# Patient Record
Sex: MALE | Race: WHITE | Employment: FULL TIME | URBAN - METROPOLITAN AREA
[De-identification: names, ages, dates, MRNs, and addresses within clinical notes are randomized per-mention and may not be internally consistent; named-entity substitution may affect disease eponyms.]

---

## 2019-10-07 RX ORDER — LANOLIN ALCOHOL/MO/W.PET/CERES
1000 CREAM (GRAM) TOPICAL DAILY
COMMUNITY

## 2019-10-07 RX ORDER — LANOLIN ALCOHOL/MO/W.PET/CERES
400 CREAM (GRAM) TOPICAL 2 TIMES DAILY
COMMUNITY

## 2019-10-07 RX ORDER — GLUCOSAMINE SULFATE 1500 MG
1000 POWDER IN PACKET (EA) ORAL DAILY
COMMUNITY

## 2019-10-07 RX ORDER — BUTALB/ACETAMINOPHEN/CAFFEINE 50-325-40
400 TABLET ORAL 2 TIMES DAILY
COMMUNITY

## 2019-10-07 RX ORDER — SULFASALAZINE 500 MG/1
1000 TABLET ORAL 2 TIMES DAILY
COMMUNITY

## 2019-10-07 RX ORDER — IRBESARTAN AND HYDROCHLOROTHIAZIDE 300; 12.5 MG/1; MG/1
1 TABLET, FILM COATED ORAL DAILY
COMMUNITY

## 2019-10-07 RX ORDER — AMLODIPINE BESYLATE 5 MG/1
5 TABLET ORAL DAILY
COMMUNITY

## 2019-10-07 RX ORDER — PREDNISONE 10 MG/1
10 TABLET ORAL DAILY
COMMUNITY

## 2019-10-08 ENCOUNTER — ANESTHESIA EVENT (OUTPATIENT)
Dept: ENDOSCOPY | Age: 64
End: 2019-10-08
Payer: MEDICARE

## 2019-10-08 ENCOUNTER — ANESTHESIA (OUTPATIENT)
Dept: ENDOSCOPY | Age: 64
End: 2019-10-08
Payer: MEDICARE

## 2019-10-08 ENCOUNTER — HOSPITAL ENCOUNTER (OUTPATIENT)
Age: 64
Setting detail: OUTPATIENT SURGERY
Discharge: HOME | End: 2019-10-08
Attending: INTERNAL MEDICINE | Admitting: INTERNAL MEDICINE
Payer: MEDICARE

## 2019-10-08 VITALS
OXYGEN SATURATION: 98 % | BODY MASS INDEX: 35.79 KG/M2 | RESPIRATION RATE: 16 BRPM | DIASTOLIC BLOOD PRESSURE: 80 MMHG | WEIGHT: 250 LBS | HEIGHT: 70 IN | SYSTOLIC BLOOD PRESSURE: 145 MMHG | HEART RATE: 65 BPM

## 2019-10-08 PROCEDURE — 74011250636 HC RX REV CODE- 250/636: Performed by: NURSE ANESTHETIST, CERTIFIED REGISTERED

## 2019-10-08 PROCEDURE — 76040000007: Performed by: INTERNAL MEDICINE

## 2019-10-08 PROCEDURE — 88305 TISSUE EXAM BY PATHOLOGIST: CPT

## 2019-10-08 PROCEDURE — 77030037041 HC FCPS HOT BIOP ENDOSC RAD JAW DISP BSC -B: Performed by: INTERNAL MEDICINE

## 2019-10-08 PROCEDURE — 76060000032 HC ANESTHESIA 0.5 TO 1 HR: Performed by: INTERNAL MEDICINE

## 2019-10-08 PROCEDURE — 74011000250 HC RX REV CODE- 250: Performed by: NURSE ANESTHETIST, CERTIFIED REGISTERED

## 2019-10-08 RX ORDER — ATROPINE SULFATE 0.1 MG/ML
0.5 INJECTION INTRAVENOUS
Status: DISCONTINUED | OUTPATIENT
Start: 2019-10-08 | End: 2019-10-08 | Stop reason: HOSPADM

## 2019-10-08 RX ORDER — PROPOFOL 10 MG/ML
INJECTION, EMULSION INTRAVENOUS AS NEEDED
Status: DISCONTINUED | OUTPATIENT
Start: 2019-10-08 | End: 2019-10-08 | Stop reason: HOSPADM

## 2019-10-08 RX ORDER — MIDAZOLAM HYDROCHLORIDE 1 MG/ML
.25-5 INJECTION, SOLUTION INTRAMUSCULAR; INTRAVENOUS
Status: DISCONTINUED | OUTPATIENT
Start: 2019-10-08 | End: 2019-10-08 | Stop reason: HOSPADM

## 2019-10-08 RX ORDER — LIDOCAINE HYDROCHLORIDE 20 MG/ML
INJECTION, SOLUTION INFILTRATION; PERINEURAL AS NEEDED
Status: DISCONTINUED | OUTPATIENT
Start: 2019-10-08 | End: 2019-10-08 | Stop reason: HOSPADM

## 2019-10-08 RX ORDER — EPINEPHRINE 0.1 MG/ML
1 INJECTION INTRACARDIAC; INTRAVENOUS
Status: DISCONTINUED | OUTPATIENT
Start: 2019-10-08 | End: 2019-10-08 | Stop reason: HOSPADM

## 2019-10-08 RX ORDER — SODIUM CHLORIDE 9 MG/ML
150 INJECTION, SOLUTION INTRAVENOUS CONTINUOUS
Status: DISCONTINUED | OUTPATIENT
Start: 2019-10-08 | End: 2019-10-08 | Stop reason: HOSPADM

## 2019-10-08 RX ORDER — SODIUM CHLORIDE 9 MG/ML
INJECTION, SOLUTION INTRAVENOUS
Status: DISCONTINUED | OUTPATIENT
Start: 2019-10-08 | End: 2019-10-08 | Stop reason: HOSPADM

## 2019-10-08 RX ORDER — DEXTROMETHORPHAN/PSEUDOEPHED 2.5-7.5/.8
1.2 DROPS ORAL
Status: DISCONTINUED | OUTPATIENT
Start: 2019-10-08 | End: 2019-10-08 | Stop reason: HOSPADM

## 2019-10-08 RX ORDER — SODIUM CHLORIDE 0.9 % (FLUSH) 0.9 %
5-40 SYRINGE (ML) INJECTION AS NEEDED
Status: DISCONTINUED | OUTPATIENT
Start: 2019-10-08 | End: 2019-10-08 | Stop reason: HOSPADM

## 2019-10-08 RX ORDER — FENTANYL CITRATE 50 UG/ML
200 INJECTION, SOLUTION INTRAMUSCULAR; INTRAVENOUS
Status: DISCONTINUED | OUTPATIENT
Start: 2019-10-08 | End: 2019-10-08 | Stop reason: HOSPADM

## 2019-10-08 RX ORDER — SODIUM CHLORIDE 0.9 % (FLUSH) 0.9 %
5-40 SYRINGE (ML) INJECTION EVERY 8 HOURS
Status: DISCONTINUED | OUTPATIENT
Start: 2019-10-08 | End: 2019-10-08 | Stop reason: HOSPADM

## 2019-10-08 RX ADMIN — PROPOFOL 40 MG: 10 INJECTION, EMULSION INTRAVENOUS at 08:44

## 2019-10-08 RX ADMIN — PROPOFOL 50 MG: 10 INJECTION, EMULSION INTRAVENOUS at 08:34

## 2019-10-08 RX ADMIN — PROPOFOL 30 MG: 10 INJECTION, EMULSION INTRAVENOUS at 08:55

## 2019-10-08 RX ADMIN — PROPOFOL 30 MG: 10 INJECTION, EMULSION INTRAVENOUS at 08:41

## 2019-10-08 RX ADMIN — PROPOFOL 30 MG: 10 INJECTION, EMULSION INTRAVENOUS at 08:47

## 2019-10-08 RX ADMIN — PROPOFOL 50 MG: 10 INJECTION, EMULSION INTRAVENOUS at 08:38

## 2019-10-08 RX ADMIN — PROPOFOL 50 MG: 10 INJECTION, EMULSION INTRAVENOUS at 08:36

## 2019-10-08 RX ADMIN — PROPOFOL 30 MG: 10 INJECTION, EMULSION INTRAVENOUS at 08:45

## 2019-10-08 RX ADMIN — SODIUM CHLORIDE: 900 INJECTION, SOLUTION INTRAVENOUS at 08:25

## 2019-10-08 RX ADMIN — PROPOFOL 50 MG: 10 INJECTION, EMULSION INTRAVENOUS at 08:37

## 2019-10-08 RX ADMIN — PROPOFOL 30 MG: 10 INJECTION, EMULSION INTRAVENOUS at 08:49

## 2019-10-08 RX ADMIN — LIDOCAINE HYDROCHLORIDE 60 MG: 20 INJECTION, SOLUTION INFILTRATION; PERINEURAL at 08:33

## 2019-10-08 NOTE — H&P
[address], [address] [place] [address]          Pre-procedure History and Physical       NAME:  Filomena Eduardo   :   1955   MRN:   [id number]     CHIEF COMPLAINT/HPI: See procedure note    PMH:  Past Medical History:   Diagnosis Date    Hypertension     Ill-defined condition     ulcerative colitis       PSH:  Past Surgical History:   Procedure Laterality Date    ABDOMEN SURGERY PROC UNLISTED      spleen removed d/t being overactive    HX TONSILLECTOMY         Allergies:  No Known Allergies    Home Medications:  Prior to Admission Medications   Prescriptions Last Dose Informant Patient Reported? Taking? MAGNESIUM PO 10/7/2019 at Unknown time  Yes Yes   Sig: Take 500 mg by mouth daily. MULTIVITAMIN PO 10/7/2019 at Unknown time  Yes Yes   Sig: Take  by mouth. Takes one po once daily. OTHER 10/7/2019 at Unknown time  Yes Yes   Sig: Celery seed 1500 mg po twice daily. amLODIPine (NORVASC) 5 mg tablet 10/7/2019 at Unknown time  Yes Yes   Sig: Take 5 mg by mouth daily. cholecalciferol (VITAMIN D3) 1,000 unit cap 10/7/2019 at Unknown time  Yes Yes   Sig: Take 1,000 Units by mouth daily. cinnamon bark (CINNAMON PO) 10/5/2019  Yes Yes   Sig: Take 2,000 mg by mouth two (2) times a day. cyanocobalamin (VITAMIN B-12) 1,000 mcg tablet 10/7/2019 at Unknown time  Yes Yes   Sig: Take 1,000 mcg by mouth daily. folic acid 146 mcg tablet 10/7/2019 at Unknown time  Yes Yes   Sig: Take 400 mcg by mouth two (2) times a day.   garlic 934 mg tab  at Unknown time  Yes Yes   Sig: Take 400 mg by mouth two (2) times a day. irbesartan-hydroCHLOROthiazide (AVALIDE) 300-12.5 mg per tablet 10/8/2019 at Unknown time  Yes Yes   Sig: Take 1 Tab by mouth daily. predniSONE (DELTASONE) 10 mg tablet 10/7/2019 at Unknown time  Yes Yes   Sig: Take 10 mg by mouth daily.  Will be finished 10/10/2019.   sulfaSALAzine (AZULFIDINE) 500 mg tablet 10/7/2019 at Unknown time  Yes Yes Sig: Take 1,000 mg by mouth two (2) times a day. turmeric (CURCUMIN) 10/7/2019 at Unknown time  Yes Yes   Sig: Take 1 Tab by mouth two (2) times a day. Facility-Administered Medications: None       Hospital Medications:  No current facility-administered medications for this encounter. Facility-Administered Medications Ordered in Other Encounters   Medication Dose Route Frequency    0.9% sodium chloride infusion   IntraVENous CONTINUOUS       Family History:  Family History   Problem Relation Age of Onset    Cancer Mother         lung    Heart Disease Father     Ulcerative Colitis Brother     Ulcerative Colitis Brother     Crohn's Disease Brother        Social History:  Social History     Tobacco Use    Smoking status: Never Smoker    Smokeless tobacco: Never Used   Substance Use Topics    Alcohol use: Not on file     Comment: no alcoholic         PHYSICAL EXAM PRIOR TO SEDATION:  General: Alert, in no acute distress    Lungs:            CTA bilaterally  Heart:  Normal S1, S2    Abdomen: Soft, Non distended, Non tender. Normoactive bowel sounds. Assessment:   Stable for sedation administration.   Date of last colonoscopy: 1 yr, Polyps  No    Plan:   · Endoscopic procedure with sedation     Signed By: Abi Ortez MD     10/8/2019  8:33 AM

## 2019-10-08 NOTE — PROCEDURES
Joleen [facility]eiros [address] Jennifer Nieto M.D.  Kellen Long, [address]  [phone]               Colonoscopy Procedure Note    NAME: Carlito Coyne  :  1955  MRN:  [id number]    Indications:   Ulcerative colitis     : Zoya Kessler MD    Referring Provider:  Alma Simmons MD    Medicines:  MAC anesthesia      Procedure Details:  After informed consent was obtained with all risks and benefits of the procedure explained and preprocedure exam completed, the patient was placed in the left lateral decubitus position. Universal protocol for patient identification was performed and documented in the nursing notes. Throughout the procedure, the patient's blood pressure was monitored at least every five minutes; pulse, and oxygen saturations were monitored continuously. All vital signs were documented in the nursing notes. A digital rectal exam was performed and was normal.  The Olympus videocolonoscope  was inserted in the rectum and carefully advanced to the terminal ileum. The colonoscope was slowly withdrawn with careful evaluation between folds. Retroflexion in the rectum was performed; findings and interventions are described below. Procedure start time, extent reached time/cecum time, and procedure end time are documented in the nursing notes. The quality of preparation was adequate.        Findings:   Rectum: severe diffuse ulceration, erythema, friability s/p biopsies  Sigmoid: moderate and severe diffuse ulceration, erythema, friability s/p biopsies  Descending Colon: moderate diffuse ulceration, erythema, friability s/p biopsies  Transverse Colon: normal s/p biopsies  Ascending Colon: diminutive polyp s/p cold forceps polypectomy, rest of the colon was normal s/p biopsies  Cecum: normal  Terminal Ileum: normal    Interventions:    biopsy of colon colon    Specimens:   ID Type Source Tests Collected by Time Destination   1 : Right colon  Preservative Colon, Right  Gracie Mack MD 10/8/2019 2051 Pathology   2 : polyp Preservative Colon, Ascending  Gracie Mack MD 10/8/2019 7879 Pathology   3 : transverse colon  Preservative Colon, Transverse  Gracie Mack MD 10/8/2019 2246 Pathology   4 : Descending colon bx  Preservative Colon, Descending  Gracie Mack MD 10/8/2019 9235 Pathology   5 : rectal sigmoid Preservative Rectal/Sigmoid  Gracie Mack MD 10/8/2019 5431 Pathology       EBL:  None. Complications:   No immediate complications     Impression:  -See post-procedure diagnoses. Moderate to severe ulcerative colitis. Recommendations:   -Await pathology. Recommendation for next colonscopy in 1 year  Flexible sigmoidoscopy in 6 months  If < 10 years, reason:  above average risk patient    Increase sulfasalazine to 3 g/day and start Rowasa enemas      Signed by:  Rebekah Adams MD          10/8/2019  9:01 AM

## 2019-10-08 NOTE — ANESTHESIA POSTPROCEDURE EVALUATION
Post-Anesthesia Evaluation and Assessment    Patient: Romeo Heard MRN: [id number]  SSN: [id number]    YOB: 1955  Age: 75 y.o. Sex: male      I have evaluated the patient and they are stable and ready for discharge from the PACU. Cardiovascular Function/Vital Signs  Visit Vitals  /80   Pulse 65   Resp 16   Ht 5' 10\" (1.778 m)   Wt 113.4 kg (250 lb)   SpO2 98%   BMI 35.87 kg/m²       Patient is status post MAC anesthesia for Procedure(s):  COLONOSCOPY  COLON BIOPSY. Nausea/Vomiting: None    Postoperative hydration reviewed and adequate. Pain:  Pain Scale 1: Numeric (0 - 10) (10/08/19 0930)  Pain Intensity 1: 0 (10/08/19 0930)   Managed    Neurological Status: At baseline    Mental Status, Level of Consciousness: Alert and  oriented to person, place, and time    Pulmonary Status:   O2 Device: Room air (10/08/19 0930)   Adequate oxygenation and airway patent    Complications related to anesthesia: None    Post-anesthesia assessment completed. No concerns    Signed By: Sheri Alcantar MD     October 8, 2019              Procedure(s):  COLONOSCOPY  COLON BIOPSY. MAC    <BSHSIANPOST>    Vitals Value Taken Time   /80 10/8/2019  9:30 AM   Temp     Pulse 67 10/8/2019  9:31 AM   Resp 0 10/8/2019  9:34 AM   SpO2 99 % 10/8/2019  9:31 AM   Vitals shown include unvalidated device data.

## 2019-10-08 NOTE — ANESTHESIA PREPROCEDURE EVALUATION
Relevant Problems   No relevant active problems       Anesthetic History   No history of anesthetic complications            Review of Systems / Medical History  Patient summary reviewed, nursing notes reviewed and pertinent labs reviewed    Pulmonary  Within defined limits                 Neuro/Psych   Within defined limits           Cardiovascular    Hypertension              Exercise tolerance: >4 METS     GI/Hepatic/Renal  Within defined limits              Endo/Other  Within defined limits           Other Findings              Physical Exam    Airway  Mallampati: II  TM Distance: > 6 cm  Neck ROM: normal range of motion   Mouth opening: Normal     Cardiovascular    Rhythm: regular  Rate: normal         Dental  No notable dental hx       Pulmonary  Breath sounds clear to auscultation               Abdominal         Other Findings            Anesthetic Plan    ASA: 2  Anesthesia type: MAC          Induction: Intravenous  Anesthetic plan and risks discussed with: Patient

## 2019-10-08 NOTE — ROUTINE PROCESS
Arnel Comment 1955 
[id number] Situation: 
Verbal report received from: DEANNA rodriguez Procedure: Procedure(s): 
COLONOSCOPY 
COLON BIOPSY Background: 
 
Preoperative diagnosis: ULCERTIVE COLITIS Postoperative diagnosis: Ulcertive colitis  
colon polyp :  Dr. Celso Walker Assistant(s): Endoscopy Technician-1: Paxton Prince Endoscopy RN-1: Kaleigh Gary RN Specimens:  
ID Type Source Tests Collected by Time Destination 1 : Right colon  Preservative Colon, Right  Petar Prasad MD 10/8/2019 3082 Pathology 2 : polyp Preservative Colon, Ascending  Petar Prasad MD 10/8/2019 8863 Pathology 3 : transverse colon  Preservative Colon, Transverse  Petar Prasad MD 10/8/2019 1507 Pathology 4 : Descending colon bx  Preservative Colon, Descending  Petar Prasad MD 10/8/2019 7205 Pathology 5 : rectal sigmoid Preservative Rectal/Sigmoid  Petar Prasad MD 10/8/2019 3215 Pathology H. Pylori  no Assessment: 
Intra-procedure medications Anesthesia gave intra-procedure sedation and medications, see anesthesia flow sheet yes Intravenous fluids:   400 NS @ Tenna Busing Vital signs stable yes Abdominal assessment: round and soft yes Recommendation: 
Discharge patient per MD order yes. Return to floor no Family or Friend : friend Permission to share finding with family or friend yes

## 2019-10-08 NOTE — DISCHARGE INSTRUCTIONS
Joleen Medeiros 912 Jaylon Florez M.D.  Harley Hagen, [address]  [phone]          COLONOSCOPY Kiran  [id number]  1955    DISCOMFORT:  Redness at IV site- apply warm compress to area; if redness or soreness persist- contact your physician  There may be a slight amount of blood passed from the rectum  Gaseous discomfort- walking, belching will help relieve any discomfort  You may not operate a vehicle for 12 hours  You may not engage in an occupation involving machinery or appliances for the  rest of today  You may not drink alcoholic beverages for at least 12 hours  Avoid making any critical decisions for at least 24 hours    DIET:   You may resume your normal diet, but some patients find that heavy or large  meals may lead to indigestion or vomiting. I suggest a light meal as first food  intake. I recommend a whole food, plant-based diet for your overall health. ACTIVITY:  You may resume your normal daily activities. It is recommended that you spend the remainder of the day resting - avoid any strenuous activity. CALL M.D. IF ANY SIGN OF:   Increasing pain, nausea, vomiting  Abdominal distension (swelling)  Significant bleeding (oral or rectal)  Fever   Pain in chest area  Shortness of breath    Additional Instructions:   Call Dr. Jaylon Florez if any questions or problems at [phone]   Setup follow-up office visit in 2 weeks   Should have a repeat colonoscopy in 1 year. Colonoscopy showed small polyp removed. Moderate to severe ulcerative colitis on the L side. Increase sulfasalazine to 1000 mg three times daily. Will send rx for Rowasa enemas to take in addition. Complete prednisone taper.

## 2019-12-20 ENCOUNTER — OFFICE VISIT (OUTPATIENT)
Dept: PRIMARY CARE CLINIC | Age: 64
End: 2019-12-20

## 2019-12-20 VITALS
RESPIRATION RATE: 16 BRPM | SYSTOLIC BLOOD PRESSURE: 157 MMHG | OXYGEN SATURATION: 94 % | WEIGHT: 250 LBS | DIASTOLIC BLOOD PRESSURE: 102 MMHG | HEIGHT: 70 IN | BODY MASS INDEX: 35.79 KG/M2 | TEMPERATURE: 99.5 F | HEART RATE: 88 BPM

## 2019-12-20 DIAGNOSIS — E66.01 SEVERE OBESITY (HCC): ICD-10-CM

## 2019-12-20 DIAGNOSIS — R68.89 FLU-LIKE SYMPTOMS: Primary | ICD-10-CM

## 2019-12-20 DIAGNOSIS — R05.9 COUGH: ICD-10-CM

## 2019-12-20 RX ORDER — BENZONATATE 200 MG/1
200 CAPSULE ORAL
Qty: 21 CAP | Refills: 0 | Status: SHIPPED | OUTPATIENT
Start: 2019-12-20 | End: 2019-12-27

## 2019-12-20 NOTE — PROGRESS NOTES
[facility]   [address], [address] [facility]  P: [phone]  F: [phone]      Chief Complaint   Patient presents with    Flu Like Symptoms     cannot say for sure if he has had fever. states that he has had symptoms for four days which include, dizziness , headache sore throat and coughing. throught hurts when he coughs. KESHIA Ferro is a 75 y.o. male who presents to clinic for Flu Like Symptoms (cannot say for sure if he has had fever. states that he has had symptoms for four days which include, dizziness , headache sore throat and coughing. throught hurts when he coughs. ). HPI:  Aixa Gutierres is a 76-year-old male, new to me, who presents today with flulike symptoms of fever, body aches, coughing, and dizziness. He has been taking OTC Tylenol with some relief and body aches. He states he did get his seasonal flu shot. His girlfriend works in home care and has been around sick patients. He is normally followed by Dr. Ghada Chiang for primary care with [facility].     Patient Active Problem List    Diagnosis    Severe obesity ([address]          Past Medical History:   Diagnosis Date    Hypertension     Ill-defined condition     ulcerative colitis     Past Surgical History:   Procedure Laterality Date    ABDOMEN SURGERY PROC UNLISTED      spleen removed d/t being overactive    COLONOSCOPY N/A 10/8/2019    COLONOSCOPY performed by Zheng Ramírez MD at [facility] ENDOSCOPY    HX TONSILLECTOMY       Social History     Socioeconomic History    Marital status: UNKNOWN     Spouse name: Not on file    Number of children: Not on file    Years of education: Not on file    Highest education level: Not on file   Occupational History    Not on file   Social Needs    Financial resource strain: Not on file    Food insecurity:     Worry: Not on file     Inability: Not on file    Transportation needs:     Medical: Not on file     Non-medical: Not on file Tobacco Use    Smoking status: Never Smoker    Smokeless tobacco: Never Used   Substance and Sexual Activity    Alcohol use: Not Currently     Comment: no alcoholic    Drug use: Never    Sexual activity: Not Currently   Lifestyle    Physical activity:     Days per week: Not on file     Minutes per session: Not on file    Stress: Not on file   Relationships    Social connections:     Talks on phone: Not on file     Gets together: Not on file     Attends [employer] service: Not on file     Active member of club or organization: Not on file     Attends meetings of clubs or organizations: Not on file     Relationship status: Not on file    Intimate partner violence:     Fear of current or ex partner: Not on file     Emotionally abused: Not on file     Physically abused: Not on file     Forced sexual activity: Not on file   Other Topics Concern    Not on file   Social History Narrative    Not on file     Family History   Problem Relation Age of Onset    Cancer Mother         lung    Heart Disease Father     Ulcerative Colitis Brother     Ulcerative Colitis Brother     Crohn's Disease Brother      No Known Allergies    Current Outpatient Medications   Medication Sig Dispense Refill    benzonatate (TESSALON) 200 mg capsule Take 1 Cap by mouth three (3) times daily as needed for Cough for up to 7 days. 21 Cap 0    sulfaSALAzine (AZULFIDINE) 500 mg tablet Take 1,000 mg by mouth two (2) times a day.  irbesartan-hydroCHLOROthiazide (AVALIDE) 300-12.5 mg per tablet Take 1 Tab by mouth daily.  amLODIPine (NORVASC) 5 mg tablet Take 5 mg by mouth daily.  MULTIVITAMIN PO Take  by mouth. Takes one po once daily.  cholecalciferol (VITAMIN D3) 1,000 unit cap Take 1,000 Units by mouth daily.  MAGNESIUM PO Take 500 mg by mouth daily.  garlic 400 mg tab Take 477 mg by mouth two (2) times a day.  folic acid 837 mcg tablet Take 400 mcg by mouth two (2) times a day.       cyanocobalamin (VITAMIN B-12) 1,000 mcg tablet Take 1,000 mcg by mouth daily.  turmeric (CURCUMIN) Take 1 Tab by mouth two (2) times a day.  cinnamon bark (CINNAMON PO) Take 2,000 mg by mouth two (2) times a day.  predniSONE (DELTASONE) 10 mg tablet Take 10 mg by mouth daily. Will be finished 10/10/2019.  OTHER Celery seed 1500 mg po twice daily. The medications were reviewed and updated in the medical record. The past medical history, past surgical history, and family history were reviewed and updated in the medical record. REVIEW OF SYSTEMS   Review of Systems   Constitutional: Positive for fever and malaise/fatigue. HENT: Negative for congestion. Eyes: Negative for blurred vision and pain. Respiratory: Positive for cough. Negative for shortness of breath. Cardiovascular: Negative for chest pain and palpitations. Gastrointestinal: Negative for abdominal pain and heartburn. Genitourinary: Negative for frequency and urgency. Musculoskeletal: Negative for joint pain and myalgias. Neurological: Negative for dizziness, tingling, sensory change, weakness and headaches. Psychiatric/Behavioral: Negative for depression, memory loss and substance abuse. PHYSICAL EXAM     Visit Vitals  BP (!) 157/102 (BP 1 Location: Left arm, BP Patient Position: Sitting)   Pulse 88   Temp 99.5 °F (37.5 °C) (Oral)   Resp 16   Ht 5' 10\" (1.778 m)   Wt 250 lb (113.4 kg)   SpO2 94%   BMI 35.87 kg/m²       Physical Exam  Vitals signs and nursing note reviewed. Constitutional:       Comments: Low grade fever. HENT:      Head: Normocephalic and atraumatic. Right Ear: Hearing, tympanic membrane, ear canal and external ear normal.      Left Ear: Hearing, tympanic membrane, ear canal and external ear normal.      Mouth/Throat:      Pharynx: No posterior oropharyngeal erythema. Tonsils: No tonsillar exudate. Cardiovascular:      Rate and Rhythm: Normal rate and regular rhythm.       Heart sounds: Normal heart sounds. Pulmonary:      Effort: Pulmonary effort is normal.      Breath sounds: Normal breath sounds. Musculoskeletal: Normal range of motion. Skin:     General: Skin is warm and dry. Neurological:      Mental Status: He is alert and oriented to person, place, and time. Gait: Gait is intact. Psychiatric:         Mood and Affect: Affect normal.         Judgment: Judgment normal.       ASSESSMENT/ PLAN   Diagnoses and all orders for this visit:    1. Flu-like symptoms  Out of the time window for Tamiflu. Encouraged rest, increased fluids, Tylenol/Ibuprofen for pain/fever and warm salt-water gargles as tolerated. Return to office if no improvement in 4-5 days. 2. Cough  -     benzonatate (TESSALON) 200 mg capsule; Take 1 Cap by mouth three (3) times daily as needed for Cough for up to 7 days. 3.  Severe obesity    Disclaimer:  Advised patient to call back or return to office if symptoms worsen/change/persist.  Discussed expected course/resolution/complications of diagnosis in detail with patient.     Medication risks/benefits/alternatives discussed with patient. Patient was given an after visit summary which includes diagnoses, current medications, & vitals.      Discussed patient instructions and advised to read to all patient instructions regarding care.      Patient expressed understanding with the diagnosis and plan. This note will not be viewable in [address]         Esteban López NP  12/20/2019        (This document has been electronically signed)

## 2019-12-20 NOTE — PROGRESS NOTES
Chief Complaint   Patient presents with    Flu Like Symptoms     cannot say for sure if he has had fever. states that he has had symptoms for four days which include, dizziness , headache sore throat and coughing. throught hurts when he coughs.

## 2022-01-11 ENCOUNTER — APPOINTMENT (OUTPATIENT)
Dept: CT IMAGING | Age: 67
End: 2022-01-11
Attending: PHYSICIAN ASSISTANT
Payer: MEDICARE

## 2022-01-11 ENCOUNTER — HOSPITAL ENCOUNTER (EMERGENCY)
Age: 67
Discharge: HOME | End: 2022-01-11
Attending: EMERGENCY MEDICINE
Payer: MEDICARE

## 2022-01-11 VITALS
WEIGHT: 257.94 LBS | TEMPERATURE: 98.9 F | OXYGEN SATURATION: 98 % | BODY MASS INDEX: 40.48 KG/M2 | DIASTOLIC BLOOD PRESSURE: 82 MMHG | HEART RATE: 98 BPM | SYSTOLIC BLOOD PRESSURE: 132 MMHG | HEIGHT: 67 IN | RESPIRATION RATE: 20 BRPM

## 2022-01-11 DIAGNOSIS — E87.1 HYPONATREMIA: ICD-10-CM

## 2022-01-11 DIAGNOSIS — N28.1 RENAL CYST: ICD-10-CM

## 2022-01-11 DIAGNOSIS — I31.39 PERICARDIAL EFFUSION: ICD-10-CM

## 2022-01-11 DIAGNOSIS — K51.911 ACUTE ULCERATIVE COLITIS WITH RECTAL BLEEDING (HCC): Primary | ICD-10-CM

## 2022-01-11 LAB
ALBUMIN SERPL-MCNC: 2.8 G/DL (ref 3.5–5)
ALBUMIN/GLOB SERPL: 0.5 {RATIO} (ref 1.1–2.2)
ALP SERPL-CCNC: 76 U/L (ref 45–117)
ALT SERPL-CCNC: 26 U/L (ref 12–78)
ANION GAP SERPL CALC-SCNC: 13 MMOL/L (ref 5–15)
APPEARANCE UR: CLEAR
AST SERPL-CCNC: 19 U/L (ref 15–37)
BACTERIA URNS QL MICRO: NEGATIVE /HPF
BASOPHILS # BLD: 0 K/UL (ref 0–0.1)
BASOPHILS NFR BLD: 0 % (ref 0–1)
BILIRUB SERPL-MCNC: 0.4 MG/DL (ref 0.2–1)
BILIRUB UR QL: NEGATIVE
BUN SERPL-MCNC: 20 MG/DL (ref 6–20)
BUN/CREAT SERPL: 14 (ref 12–20)
CALCIUM SERPL-MCNC: 9.2 MG/DL (ref 8.5–10.1)
CHLORIDE SERPL-SCNC: 94 MMOL/L (ref 97–108)
CO2 SERPL-SCNC: 23 MMOL/L (ref 21–32)
COLOR UR: ABNORMAL
CREAT SERPL-MCNC: 1.48 MG/DL (ref 0.7–1.3)
DIFFERENTIAL METHOD BLD: ABNORMAL
EOSINOPHIL # BLD: 0 K/UL (ref 0–0.4)
EOSINOPHIL NFR BLD: 0 % (ref 0–7)
EPITH CASTS URNS QL MICRO: ABNORMAL /LPF
ERYTHROCYTE [DISTWIDTH] IN BLOOD BY AUTOMATED COUNT: 15.7 % (ref 11.5–14.5)
GLOBULIN SER CALC-MCNC: 5.5 G/DL (ref 2–4)
GLUCOSE SERPL-MCNC: 99 MG/DL (ref 65–100)
GLUCOSE UR STRIP.AUTO-MCNC: NEGATIVE MG/DL
HCT VFR BLD AUTO: 42.5 % (ref 36.6–50.3)
HGB BLD-MCNC: 13.9 G/DL (ref 12.1–17)
HGB UR QL STRIP: ABNORMAL
IMM GRANULOCYTES # BLD AUTO: 0.2 K/UL (ref 0–0.04)
IMM GRANULOCYTES NFR BLD AUTO: 1 % (ref 0–0.5)
KETONES UR QL STRIP.AUTO: NEGATIVE MG/DL
LEUKOCYTE ESTERASE UR QL STRIP.AUTO: NEGATIVE
LIPASE SERPL-CCNC: 127 U/L (ref 73–393)
LYMPHOCYTES # BLD: 2.9 K/UL (ref 0.8–3.5)
LYMPHOCYTES NFR BLD: 15 % (ref 12–49)
MCH RBC QN AUTO: 31 PG (ref 26–34)
MCHC RBC AUTO-ENTMCNC: 32.7 G/DL (ref 30–36.5)
MCV RBC AUTO: 94.7 FL (ref 80–99)
MONOCYTES # BLD: 2.9 K/UL (ref 0–1)
MONOCYTES NFR BLD: 15 % (ref 5–13)
NEUTS SEG # BLD: 13.2 K/UL (ref 1.8–8)
NEUTS SEG NFR BLD: 69 % (ref 32–75)
NITRITE UR QL STRIP.AUTO: NEGATIVE
NRBC # BLD: 0 K/UL (ref 0–0.01)
NRBC BLD-RTO: 0 PER 100 WBC
PH UR STRIP: 6 [PH] (ref 5–8)
PLATELET # BLD AUTO: 520 K/UL (ref 150–400)
PMV BLD AUTO: 9 FL (ref 8.9–12.9)
POTASSIUM SERPL-SCNC: 3.5 MMOL/L (ref 3.5–5.1)
PROT SERPL-MCNC: 8.3 G/DL (ref 6.4–8.2)
PROT UR STRIP-MCNC: NEGATIVE MG/DL
RBC # BLD AUTO: 4.49 M/UL (ref 4.1–5.7)
RBC #/AREA URNS HPF: ABNORMAL /HPF (ref 0–5)
RBC MORPH BLD: ABNORMAL
SODIUM SERPL-SCNC: 130 MMOL/L (ref 136–145)
SP GR UR REFRACTOMETRY: <1.005 (ref 1–1.03)
UR CULT HOLD, URHOLD: NORMAL
UROBILINOGEN UR QL STRIP.AUTO: 0.2 EU/DL (ref 0.2–1)
WBC # BLD AUTO: 19.2 K/UL (ref 4.1–11.1)
WBC URNS QL MICRO: ABNORMAL /HPF (ref 0–4)

## 2022-01-11 PROCEDURE — 74011000636 HC RX REV CODE- 636: Performed by: EMERGENCY MEDICINE

## 2022-01-11 PROCEDURE — 85025 COMPLETE CBC W/AUTO DIFF WBC: CPT

## 2022-01-11 PROCEDURE — 96374 THER/PROPH/DIAG INJ IV PUSH: CPT

## 2022-01-11 PROCEDURE — 99284 EMERGENCY DEPT VISIT MOD MDM: CPT

## 2022-01-11 PROCEDURE — 81001 URINALYSIS AUTO W/SCOPE: CPT

## 2022-01-11 PROCEDURE — 96375 TX/PRO/DX INJ NEW DRUG ADDON: CPT

## 2022-01-11 PROCEDURE — 74011250636 HC RX REV CODE- 250/636: Performed by: PHYSICIAN ASSISTANT

## 2022-01-11 PROCEDURE — 83690 ASSAY OF LIPASE: CPT

## 2022-01-11 PROCEDURE — 80053 COMPREHEN METABOLIC PANEL: CPT

## 2022-01-11 PROCEDURE — 74177 CT ABD & PELVIS W/CONTRAST: CPT

## 2022-01-11 PROCEDURE — 36415 COLL VENOUS BLD VENIPUNCTURE: CPT

## 2022-01-11 RX ORDER — ONDANSETRON 4 MG/1
4 TABLET, ORALLY DISINTEGRATING ORAL
Qty: 10 TABLET | Refills: 0 | Status: SHIPPED | OUTPATIENT
Start: 2022-01-11

## 2022-01-11 RX ORDER — ONDANSETRON 2 MG/ML
4 INJECTION INTRAMUSCULAR; INTRAVENOUS
Status: COMPLETED | OUTPATIENT
Start: 2022-01-11 | End: 2022-01-11

## 2022-01-11 RX ADMIN — SODIUM CHLORIDE 1000 ML: 9 INJECTION, SOLUTION INTRAVENOUS at 18:10

## 2022-01-11 RX ADMIN — METHYLPREDNISOLONE SODIUM SUCCINATE 30 MG: 40 INJECTION, POWDER, FOR SOLUTION INTRAMUSCULAR; INTRAVENOUS at 20:48

## 2022-01-11 RX ADMIN — IOPAMIDOL 100 ML: 755 INJECTION, SOLUTION INTRAVENOUS at 18:44

## 2022-01-11 RX ADMIN — ONDANSETRON HYDROCHLORIDE 4 MG: 2 SOLUTION INTRAMUSCULAR; INTRAVENOUS at 18:09

## 2022-01-11 NOTE — ED TRIAGE NOTES
Pt arrived to ed for c/o vomiting secondaru to UC. abd pain. Blood in stool that started 2 months ago and didn't go away. On prednisone.

## 2022-01-11 NOTE — ED PROVIDER NOTES
63yo with PMH of Crohn's disease presents with worsening flare x 1 month with associated N/V and worsening symptoms despite prednisone prescribed by GI last week. He states his flare began 1 month ago, was first seen at [facility] and started on a course of prednisone. He followed up with his GI doctor, Dr. Comfort Ortiz last week, was started on prednisone for the next month and was told to continue his sulfasalazine 1500mg BID (has been taking this dose for 2 years). He was told to f/u with GI for a colonoscopy and is shceulded for one on 02/01. He is c/o worsening abd pain, still c/o bright red bloody stools daily and has been vomiting \"every morning\" for the past week and had decreased appetite. He is also feeling generalized weakness/malaise. No CP, fever. No NSAIDs, ASA or ETOH use.      GI: Nena Jenkins                Past Medical History:   Diagnosis Date    Hypertension     Ill-defined condition     ulcerative colitis       Past Surgical History:   Procedure Laterality Date    ABDOMEN SURGERY PROC UNLISTED      spleen removed d/t being overactive    COLONOSCOPY N/A 10/8/2019    COLONOSCOPY performed by Kumar [facility] at [facility] ENDOSCOPY    HX TONSILLECTOMY           Family History:   Problem Relation Age of Onset    Cancer Mother         lung    Heart Disease Father     Ulcerative Colitis Brother     Ulcerative Colitis Brother     Crohn's Disease Brother        Social History     Socioeconomic History    Marital status: UNKNOWN     Spouse name: Not on file    Number of children: Not on file    Years of education: Not on file    Highest education level: Not on file   Occupational History    Not on file   Tobacco Use    Smoking status: Never Smoker    Smokeless tobacco: Never Used   Substance and Sexual Activity    Alcohol use: Not Currently     Comment: no alcoholic    Drug use: Never    Sexual activity: Not Currently   Other Topics Concern    Not on file   Social History Narrative  Not on file     Social Determinants of Health     Financial Resource Strain:     Difficulty of Paying Living Expenses: Not on file   Food Insecurity:     Worried About Running Out of Food in the Last Year: Not on file    Norma of Food in the Last Year: Not on file   Transportation Needs:     Lack of Transportation (Medical): Not on file    Lack of Transportation (Non-Medical): Not on file   Physical Activity:     Days of Exercise per Week: Not on file    Minutes of Exercise per Session: Not on file   Stress:     Feeling of Stress : Not on file   Social Connections:     Frequency of Communication with Friends and Family: Not on file    Frequency of Social Gatherings with Friends and Family: Not on file    Attends Spiritism Services: Not on file    Active Member of [address] [place] [address] [employer] or Organizations: Not on file    Attends Club or Organization Meetings: Not on file    Marital Status: Not on file   Intimate Partner Violence:     Fear of Current or Ex-Partner: Not on file    Emotionally Abused: Not on file    Physically Abused: Not on file    Sexually Abused: Not on file   Housing Stability:     Unable to Pay for Housing in the Last Year: Not on file    Number of [place] in the Last Year: Not on file    Unstable Housing in the Last Year: Not on file         ALLERGIES: Patient has no known allergies. Review of Systems   Constitutional: Positive for appetite change. Negative for activity change, chills, fatigue, fever and unexpected weight change. HENT: Negative for trouble swallowing. Respiratory: Negative for cough, chest tightness, shortness of breath and wheezing. Cardiovascular: Negative. Negative for chest pain and palpitations. Gastrointestinal: Positive for abdominal pain and blood in stool. Negative for diarrhea, nausea and vomiting. Genitourinary: Negative. Negative for dysuria, flank pain, frequency and hematuria. Musculoskeletal: Negative.   Negative for arthralgias, back pain, neck pain and neck stiffness. Skin: Negative. Negative for color change and rash. Neurological: Negative. Negative for dizziness, numbness and headaches. All other systems reviewed and are negative. Vitals:    01/11/22 1619   BP: (!) 147/84   Pulse: (!) 117   Resp: 18   Temp: 98.9 °F (37.2 °C)   SpO2: 98%   Weight: 117 kg (257 lb 15 oz)   Height: 5' 6.93\" (1.7 m)            Physical Exam  Vitals and nursing note reviewed. Constitutional:       General: He is not in acute distress. Appearance: He is well-developed. He is not toxic-appearing or diaphoretic. HENT:      Head: Normocephalic and atraumatic. Eyes:      General:         Right eye: No discharge. Left eye: No discharge. Conjunctiva/sclera: Conjunctivae normal.      Pupils: Pupils are equal, round, and reactive to light. Neck:      Trachea: No tracheal tenderness. Cardiovascular:      Rate and Rhythm: Normal rate and regular rhythm. Pulses: Normal pulses. Heart sounds: Normal heart sounds. No murmur heard. No friction rub. No gallop. Pulmonary:      Effort: Pulmonary effort is normal. No respiratory distress. Breath sounds: Normal breath sounds. No wheezing or rales. Chest:      Chest wall: No tenderness. Abdominal:      General: Bowel sounds are normal. There is no distension. Palpations: Abdomen is soft. Tenderness: There is abdominal tenderness (generalized ABD TTP). There is no guarding or rebound. Musculoskeletal:         General: No tenderness. Normal range of motion. Cervical back: Full passive range of motion without pain and normal range of motion. Skin:     General: Skin is warm and dry. Capillary Refill: Capillary refill takes less than 2 seconds. Findings: No abrasion, erythema or rash. Neurological:      Mental Status: He is alert and oriented to person, place, and time. Cranial Nerves: No cranial nerve deficit.       Sensory: No sensory deficit. Coordination: Coordination normal.   Psychiatric:         Speech: Speech normal.         Behavior: Behavior normal.          MDM  Number of Diagnoses or Management Options  Acute ulcerative colitis with rectal bleeding (HCC)  Hyponatremia  Diagnosis management comments:   Ddx: UC flare, electrolyte abnormality, blood loss anemia, diverticulitis, colitis       Amount and/or Complexity of Data Reviewed  Clinical lab tests: ordered and reviewed  Tests in the radiology section of CPT®: ordered and reviewed  Review and summarize past medical records: yes  Discuss the patient with other providers: yes    Patient Progress  Patient progress: stable         Procedures      I discussed patient's PMH, exam findings as well as careplan with the ER attending who agrees with care plan. Brissa Castillo PA-C      CONSULT NOTE:   7:34 PM  Brissa Castillo PA-C spoke with Dr. Gayatri De Paz,   Specialty: GI on call for Dr. Iva Calderón  Discussed pt's hx, disposition, and available diagnostic and imaging results. Reviewed care plans. Consultant agrees with plans as outlined. Dr. Cortez New states patient can be admitted, started on Solu-Medrol 30 mg IV twice daily and GI will see tomorrow. Written by Brissa Castillo PA-C. I discussed care plan and GI recommendation with patient. I offered admission as oral medications are not helping for his condition. He declines admission at this time, states he is pain-free, feeling better and would like to be discharged and agrees to contact his GI doctor tomorrow to discuss further management. He will continue his sulfasalazine and prednisone as directed. He agrees to return if fever, continued vomiting, worsening pain or other concerning symptoms develop. Return precautions discussed. DISCHARGE NOTE:  The patient has been re-evaluated and feeling much better and are stable for discharge.   All available radiology and laboratory results have been reviewed with patient and/or available family. Patient and/or family verbally conveyed their understanding and agreement of the patient's signs, symptoms, diagnosis, treatment and prognosis and additionally agree to follow-up as recommended in the discharge instructions or to return to the Emergency Department should their condition change or worsen prior to their follow-up appointment. All questions have been answered and patient and/or available family express understanding. LABORATORY RESULTS:  Recent Results (from the past 12 hour(s))   CBC WITH AUTOMATED DIFF    Collection Time: 01/11/22  4:27 PM   Result Value Ref Range    WBC 19.2 (H) 4.1 - 11.1 K/uL    RBC 4.49 4. 10 - 5.70 M/uL    HGB 13.9 12.1 - 17.0 g/dL    HCT 42.5 36.6 - 50.3 %    MCV 94.7 80.0 - 99.0 FL    MCH 31.0 26.0 - 34.0 PG    MCHC 32.7 30.0 - 36.5 g/dL    RDW 15.7 (H) 11.5 - 14.5 %    PLATELET 511 (H) 776 - 400 K/uL    MPV 9.0 8.9 - 12.9 FL    NRBC 0.0 0  WBC    ABSOLUTE NRBC 0.00 0.00 - 0.01 K/uL    NEUTROPHILS 69 32 - 75 %    LYMPHOCYTES 15 12 - 49 %    MONOCYTES 15 (H) 5 - 13 %    EOSINOPHILS 0 0 - 7 %    BASOPHILS 0 0 - 1 %    IMMATURE GRANULOCYTES 1 (H) 0.0 - 0.5 %    ABS. NEUTROPHILS 13.2 (H) 1.8 - 8.0 K/UL    ABS. LYMPHOCYTES 2.9 0.8 - 3.5 K/UL    ABS. MONOCYTES 2.9 (H) 0.0 - 1.0 K/UL    ABS. EOSINOPHILS 0.0 0.0 - 0.4 K/UL    ABS. BASOPHILS 0.0 0.0 - 0.1 K/UL    ABS. IMM.  GRANS. 0.2 (H) 0.00 - 0.04 K/UL    DF SMEAR SCANNED      RBC COMMENTS ANISOCYTOSIS  1+       METABOLIC PANEL, COMPREHENSIVE    Collection Time: 01/11/22  4:27 PM   Result Value Ref Range    Sodium 130 (L) 136 - 145 mmol/L    Potassium 3.5 3.5 - 5.1 mmol/L    Chloride 94 (L) 97 - 108 mmol/L    CO2 23 21 - 32 mmol/L    Anion gap 13 5 - 15 mmol/L    Glucose 99 65 - 100 mg/dL    BUN 20 6 - 20 MG/DL    Creatinine 1.48 (H) 0.70 - 1.30 MG/DL    BUN/Creatinine ratio 14 12 - 20      GFR est AA 57 (L) >60 ml/min/1.73m2    GFR est non-AA 47 (L) >60 ml/min/1.73m2    Calcium 9.2 8.5 - 10.1 MG/DL    Bilirubin, total 0.4 0.2 - 1.0 MG/DL    ALT (SGPT) 26 12 - 78 U/L    AST (SGOT) 19 15 - 37 U/L    Alk. phosphatase 76 45 - 117 U/L    Protein, total 8.3 (H) 6.4 - 8.2 g/dL    Albumin 2.8 (L) 3.5 - 5.0 g/dL    Globulin 5.5 (H) 2.0 - 4.0 g/dL    A-G Ratio 0.5 (L) 1.1 - 2.2     LIPASE    Collection Time: 01/11/22  4:30 PM   Result Value Ref Range    Lipase 127 73 - 393 U/L   URINALYSIS W/MICROSCOPIC    Collection Time: 01/11/22  7:00 PM   Result Value Ref Range    Color DARK YELLOW      Appearance CLEAR CLEAR      Specific gravity <1.005 1.003 - 1.030    pH (UA) 6.0 5.0 - 8.0      Protein Negative NEG mg/dL    Glucose Negative NEG mg/dL    Ketone Negative NEG mg/dL    Bilirubin Negative NEG      Blood TRACE (A) NEG      Urobilinogen 0.2 0.2 - 1.0 EU/dL    Nitrites Negative NEG      Leukocyte Esterase Negative NEG      WBC 0-4 0 - 4 /hpf    RBC 0-5 0 - 5 /hpf    Epithelial cells FEW FEW /lpf    Bacteria Negative NEG /hpf   URINE CULTURE HOLD SAMPLE    Collection Time: 01/11/22  7:00 PM    Specimen: Serum; Urine   Result Value Ref Range    Urine culture hold        Urine on hold in Microbiology dept for 2 days. If unpreserved urine is submitted, it cannot be used for addtional testing after 24 hours, recollection will be required. IMAGING RESULTS:  CT ABD PELV W CONT    Result Date: 1/11/2022  1. Large segment of left-sided colitis extending from the splenic flexure to the anus compatible with inflammatory bowel disease. No drainable fluid collection 2. Complex 2 cm left renal cyst. Dedicated renal protocol CT and urology follow-up is recommended 3.  Moderate pericardial effusion      MEDICATIONS GIVEN:  Medications   sodium chloride 0.9 % bolus infusion 1,000 mL (0 mL IntraVENous IV Completed 1/11/22 2054)   ondansetron (ZOFRAN) injection 4 mg (4 mg IntraVENous Given 1/11/22 1809)   iopamidoL (ISOVUE-370) 76 % injection 100 mL (100 mL IntraVENous Given 1/11/22 1844)   methylPREDNISolone (PF) (SOLU-MEDROL) injection 30 mg (30 mg IntraVENous Given 1/11/22 2048)       IMPRESSION:  1. Acute ulcerative colitis with rectal bleeding ([address]    2. Hyponatremia    3. Renal cyst    4. Pericardial effusion        PLAN:  Follow-up Information     Follow up With Specialties Details Why Contact Info    Irma Avilez MD [facility] Schedule an appointment as soon as possible for a visit  your PCP for follow-up. [address] [place] [address]  [address] [facility]      Aurora Sanchez MD Gastroenterology Schedule an appointment as soon as possible for a visit  GI specialist for follow-up. [address] [facility] [address]  [address] [facility]  [phone]          Discharge Medication List as of 1/11/2022  8:16 PM      START taking these medications    Details   ondansetron (ZOFRAN ODT) 4 mg disintegrating tablet Take 1 Tablet by mouth every eight (8) hours as needed for Nausea or Vomiting., Print, Disp-10 Tablet, R-0         CONTINUE these medications which have NOT CHANGED    Details   sulfaSALAzine (AZULFIDINE) 500 mg tablet Take 1,000 mg by mouth two (2) times a day., Historical Med      irbesartan-hydroCHLOROthiazide (AVALIDE) 300-12.5 mg per tablet Take 1 Tab by mouth daily. , Historical Med      amLODIPine (NORVASC) 5 mg tablet Take 5 mg by mouth daily. , Historical Med      predniSONE (DELTASONE) 10 mg tablet Take 10 mg by mouth daily. Will be finished 10/10/2019., Historical Med      MULTIVITAMIN PO Take  by mouth. Takes one po once daily. , Historical Med      cholecalciferol (VITAMIN D3) 1,000 unit cap Take 1,000 Units by mouth daily. , Historical Med      MAGNESIUM PO Take 500 mg by mouth daily. , Historical Med      garlic 382 mg tab Take 557 mg by mouth two (2) times a day., Historical Med      folic acid 074 mcg tablet Take 400 mcg by mouth two (2) times a day., Historical Med      OTHER Celery seed 1500 mg po twice daily. , Historical Med      cyanocobalamin (VITAMIN B-12) 1,000 mcg tablet Take 1,000 mcg by mouth daily. , Historical Med      turmeric (CURCUMIN) Take 1 Tab by mouth two (2) times a day., Historical Med      cinnamon bark (CINNAMON PO) Take 2,000 mg by mouth two (2) times a day., Historical Med

## 2022-01-12 NOTE — ED NOTES
Discharge note: The patient was discharged home in stable condition. The patient is alert and oriented, is in no respiratory distress. The patient's diagnosis, condition and treatment were explained to patient by PA and reinforced by nurse. The patient expressed understanding of discharge instructions, prescriptions, and plan of care. A discharge plan has been developed. A  was not involved in the process. Patient offered a wheelchair to ED lobby for discharge but declined at this time. Patient ambulatory to ED lobby to go home.

## 2022-01-12 NOTE — DISCHARGE INSTRUCTIONS
Continue your medications as directed by Dr. Terrance Loya. Continue a soft food / liquid diet. Call tomorrow to discuss plan of action with Dr. Terrance Loya. Return to the emergency department if symptoms worsen.

## 2022-03-18 PROBLEM — E66.01 SEVERE OBESITY (HCC): Status: ACTIVE | Noted: 2019-12-20

## 2023-05-11 RX ORDER — AMLODIPINE BESYLATE 5 MG/1
5 TABLET ORAL DAILY
COMMUNITY

## 2023-05-11 RX ORDER — IRBESARTAN AND HYDROCHLOROTHIAZIDE 300; 12.5 MG/1; MG/1
1 TABLET, FILM COATED ORAL DAILY
COMMUNITY

## 2023-05-11 RX ORDER — SULFASALAZINE 500 MG/1
TABLET ORAL 2 TIMES DAILY
COMMUNITY

## 2023-05-11 RX ORDER — LANOLIN ALCOHOL/MO/W.PET/CERES
CREAM (GRAM) TOPICAL 2 TIMES DAILY
COMMUNITY

## 2023-05-11 RX ORDER — ONDANSETRON 4 MG/1
TABLET, ORALLY DISINTEGRATING ORAL EVERY 8 HOURS PRN
COMMUNITY
Start: 2022-01-11

## 2023-05-11 RX ORDER — PREDNISONE 10 MG/1
TABLET ORAL DAILY
COMMUNITY

## 2023-10-17 ENCOUNTER — HOSPITAL ENCOUNTER (EMERGENCY)
Facility: HOSPITAL | Age: 68
Discharge: ANOTHER HOSPITAL | End: 2023-10-17
Attending: EMERGENCY MEDICINE
Payer: MEDICARE

## 2023-10-17 ENCOUNTER — APPOINTMENT (OUTPATIENT)
Facility: HOSPITAL | Age: 68
End: 2023-10-17
Payer: MEDICARE

## 2023-10-17 VITALS
OXYGEN SATURATION: 91 % | TEMPERATURE: 98.1 F | BODY MASS INDEX: 30.39 KG/M2 | RESPIRATION RATE: 28 BRPM | WEIGHT: 212.3 LBS | HEART RATE: 72 BPM | SYSTOLIC BLOOD PRESSURE: 134 MMHG | HEIGHT: 70 IN | DIASTOLIC BLOOD PRESSURE: 63 MMHG

## 2023-10-17 DIAGNOSIS — I31.39 PERICARDIAL EFFUSION: ICD-10-CM

## 2023-10-17 DIAGNOSIS — K51.919 ULCERATIVE COLITIS WITH COMPLICATION, UNSPECIFIED LOCATION (HCC): ICD-10-CM

## 2023-10-17 DIAGNOSIS — N17.9 ACUTE KIDNEY INJURY (HCC): Primary | ICD-10-CM

## 2023-10-17 LAB
ALBUMIN SERPL-MCNC: 2.6 G/DL (ref 3.5–5)
ALBUMIN/GLOB SERPL: 0.5 (ref 1.1–2.2)
ALP SERPL-CCNC: 73 U/L (ref 45–117)
ALT SERPL-CCNC: 24 U/L (ref 12–78)
ANION GAP SERPL CALC-SCNC: 14 MMOL/L (ref 5–15)
APPEARANCE UR: CLEAR
AST SERPL-CCNC: 14 U/L (ref 15–37)
BACTERIA URNS QL MICRO: ABNORMAL /HPF
BASOPHILS # BLD: 0 K/UL (ref 0–0.1)
BASOPHILS NFR BLD: 0 % (ref 0–1)
BILIRUB SERPL-MCNC: 0.7 MG/DL (ref 0.2–1)
BILIRUB UR QL: NEGATIVE
BUN SERPL-MCNC: 19 MG/DL (ref 6–20)
BUN/CREAT SERPL: 12 (ref 12–20)
CALCIUM SERPL-MCNC: 8.5 MG/DL (ref 8.5–10.1)
CHLORIDE SERPL-SCNC: 97 MMOL/L (ref 97–108)
CO2 SERPL-SCNC: 23 MMOL/L (ref 21–32)
COLOR UR: ABNORMAL
COMMENT:: NORMAL
CREAT SERPL-MCNC: 1.62 MG/DL (ref 0.7–1.3)
DIFFERENTIAL METHOD BLD: ABNORMAL
EKG ATRIAL RATE: 81 BPM
EKG DIAGNOSIS: NORMAL
EKG P AXIS: 52 DEGREES
EKG P-R INTERVAL: 138 MS
EKG Q-T INTERVAL: 368 MS
EKG QRS DURATION: 82 MS
EKG QTC CALCULATION (BAZETT): 427 MS
EKG R AXIS: 36 DEGREES
EKG T AXIS: 2 DEGREES
EKG VENTRICULAR RATE: 81 BPM
EOSINOPHIL # BLD: 0 K/UL (ref 0–0.4)
EOSINOPHIL NFR BLD: 0 % (ref 0–7)
EPITH CASTS URNS QL MICRO: ABNORMAL /LPF
ERYTHROCYTE [DISTWIDTH] IN BLOOD BY AUTOMATED COUNT: 14.7 % (ref 11.5–14.5)
GLOBULIN SER CALC-MCNC: 5.7 G/DL (ref 2–4)
GLUCOSE SERPL-MCNC: 106 MG/DL (ref 65–100)
GLUCOSE UR STRIP.AUTO-MCNC: NEGATIVE MG/DL
HCT VFR BLD AUTO: 39.7 % (ref 36.6–50.3)
HGB BLD-MCNC: 13.5 G/DL (ref 12.1–17)
HGB UR QL STRIP: ABNORMAL
IMM GRANULOCYTES # BLD AUTO: 0.1 K/UL (ref 0–0.04)
IMM GRANULOCYTES NFR BLD AUTO: 1 % (ref 0–0.5)
KETONES UR QL STRIP.AUTO: 15 MG/DL
LEUKOCYTE ESTERASE UR QL STRIP.AUTO: NEGATIVE
LIPASE SERPL-CCNC: 37 U/L (ref 13–75)
LYMPHOCYTES # BLD: 0.4 K/UL (ref 0.8–3.5)
LYMPHOCYTES NFR BLD: 3 % (ref 12–49)
MAGNESIUM SERPL-MCNC: 1.8 MG/DL (ref 1.6–2.4)
MCH RBC QN AUTO: 31.4 PG (ref 26–34)
MCHC RBC AUTO-ENTMCNC: 34 G/DL (ref 30–36.5)
MCV RBC AUTO: 92.3 FL (ref 80–99)
MONOCYTES # BLD: 1.5 K/UL (ref 0–1)
MONOCYTES NFR BLD: 10 % (ref 5–13)
NEUTS SEG # BLD: 12.7 K/UL (ref 1.8–8)
NEUTS SEG NFR BLD: 86 % (ref 32–75)
NITRITE UR QL STRIP.AUTO: NEGATIVE
NRBC # BLD: 0 K/UL (ref 0–0.01)
NRBC BLD-RTO: 0 PER 100 WBC
PH UR STRIP: 7.5 (ref 5–8)
PLATELET # BLD AUTO: 471 K/UL (ref 150–400)
PMV BLD AUTO: 9.2 FL (ref 8.9–12.9)
POTASSIUM SERPL-SCNC: 2.9 MMOL/L (ref 3.5–5.1)
PROT SERPL-MCNC: 8.3 G/DL (ref 6.4–8.2)
PROT UR STRIP-MCNC: 30 MG/DL
RBC # BLD AUTO: 4.3 M/UL (ref 4.1–5.7)
RBC #/AREA URNS HPF: ABNORMAL /HPF (ref 0–5)
RBC MORPH BLD: ABNORMAL
RBC MORPH BLD: ABNORMAL
SODIUM SERPL-SCNC: 134 MMOL/L (ref 136–145)
SP GR UR REFRACTOMETRY: >1.03 (ref 1–1.03)
SPECIMEN HOLD: NORMAL
UROBILINOGEN UR QL STRIP.AUTO: 0.2 EU/DL (ref 0.2–1)
WBC # BLD AUTO: 14.7 K/UL (ref 4.1–11.1)
WBC URNS QL MICRO: ABNORMAL /HPF (ref 0–4)

## 2023-10-17 PROCEDURE — 6360000002 HC RX W HCPCS: Performed by: EMERGENCY MEDICINE

## 2023-10-17 PROCEDURE — 6360000004 HC RX CONTRAST MEDICATION: Performed by: EMERGENCY MEDICINE

## 2023-10-17 PROCEDURE — 96374 THER/PROPH/DIAG INJ IV PUSH: CPT

## 2023-10-17 PROCEDURE — 85025 COMPLETE CBC W/AUTO DIFF WBC: CPT

## 2023-10-17 PROCEDURE — 96375 TX/PRO/DX INJ NEW DRUG ADDON: CPT

## 2023-10-17 PROCEDURE — 36415 COLL VENOUS BLD VENIPUNCTURE: CPT

## 2023-10-17 PROCEDURE — 74177 CT ABD & PELVIS W/CONTRAST: CPT

## 2023-10-17 PROCEDURE — 93005 ELECTROCARDIOGRAM TRACING: CPT | Performed by: EMERGENCY MEDICINE

## 2023-10-17 PROCEDURE — 2580000003 HC RX 258: Performed by: EMERGENCY MEDICINE

## 2023-10-17 PROCEDURE — 80053 COMPREHEN METABOLIC PANEL: CPT

## 2023-10-17 PROCEDURE — 83735 ASSAY OF MAGNESIUM: CPT

## 2023-10-17 PROCEDURE — 81001 URINALYSIS AUTO W/SCOPE: CPT

## 2023-10-17 PROCEDURE — 99285 EMERGENCY DEPT VISIT HI MDM: CPT

## 2023-10-17 PROCEDURE — 83690 ASSAY OF LIPASE: CPT

## 2023-10-17 RX ORDER — POTASSIUM CHLORIDE 7.45 MG/ML
10 INJECTION INTRAVENOUS ONCE
Status: DISCONTINUED | OUTPATIENT
Start: 2023-10-17 | End: 2023-10-17 | Stop reason: HOSPADM

## 2023-10-17 RX ORDER — 0.9 % SODIUM CHLORIDE 0.9 %
1000 INTRAVENOUS SOLUTION INTRAVENOUS ONCE
Status: DISCONTINUED | OUTPATIENT
Start: 2023-10-17 | End: 2023-10-17 | Stop reason: HOSPADM

## 2023-10-17 RX ORDER — ONDANSETRON 2 MG/ML
4 INJECTION INTRAMUSCULAR; INTRAVENOUS
Status: COMPLETED | OUTPATIENT
Start: 2023-10-17 | End: 2023-10-17

## 2023-10-17 RX ORDER — 0.9 % SODIUM CHLORIDE 0.9 %
1000 INTRAVENOUS SOLUTION INTRAVENOUS ONCE
Status: COMPLETED | OUTPATIENT
Start: 2023-10-17 | End: 2023-10-17

## 2023-10-17 RX ADMIN — METHYLPREDNISOLONE SODIUM SUCCINATE 125 MG: 125 INJECTION, POWDER, FOR SOLUTION INTRAMUSCULAR; INTRAVENOUS at 14:03

## 2023-10-17 RX ADMIN — SODIUM CHLORIDE 1000 ML: 9 INJECTION, SOLUTION INTRAVENOUS at 12:11

## 2023-10-17 RX ADMIN — IOPAMIDOL 100 ML: 755 INJECTION, SOLUTION INTRAVENOUS at 11:12

## 2023-10-17 RX ADMIN — ONDANSETRON 4 MG: 2 INJECTION INTRAMUSCULAR; INTRAVENOUS at 10:35

## 2023-10-17 ASSESSMENT — ENCOUNTER SYMPTOMS: ABDOMINAL PAIN: 1

## 2023-10-17 ASSESSMENT — LIFESTYLE VARIABLES: HOW OFTEN DO YOU HAVE A DRINK CONTAINING ALCOHOL: NEVER

## 2023-10-17 ASSESSMENT — PAIN - FUNCTIONAL ASSESSMENT: PAIN_FUNCTIONAL_ASSESSMENT: NONE - DENIES PAIN

## 2023-10-17 NOTE — ED NOTES
Patient stable at time of transfer. Report given to [facility] staff for transport. [facility] provided with EMTALA, PCS, encounter summary, and facesheet for transport. Patient out of department via stretcher accompanied by [facility] staff.        Mame Sanders RN  10/17/23 4713

## 2023-10-17 NOTE — ED PROVIDER NOTES
(*)     Albumin 2.6 (*)     Globulin 5.7 (*)     Albumin/Globulin Ratio 0.5 (*)     All other components within normal limits   URINALYSIS WITH MICROSCOPIC - Abnormal; Notable for the following components:    Specific Gravity, UA >1.030 (*)     Protein, UA 30 (*)     Ketones, Urine 15 (*)     Blood, Urine SMALL (*)     Epithelial Cells UA MODERATE (*)     BACTERIA, URINE 2+ (*)     All other components within normal limits   EXTRA TUBES HOLD   LIPASE   MAGNESIUM       All other labs were unremarkable or not returned as of this dictation. EMERGENCY DEPARTMENT COURSE and DIFFERENTIAL DIAGNOSIS/MDM:   Medical Decision Making  Patient presents with abdominal pain and concern for worsening ulcerative colitis. He has failed multiple outpatient therapies including Biologics for UC in the past.  He has not been able to keep anything down for the last several days. He was hydrated with IV fluids and labs were obtained. Labs show mildly elevated white blood cell count. He also has evidence of acute kidney injury with a creatinine of 1.6. CT shows severe left-sided colitis and pericardial effusion. Case discussed with the patient's gastroenterologist.  He recommended steroids. Given his acute kidney injury, pericardial effusion which appears to be enlarging and severe colitis I will transfer him for admission to [facility] Drs. Spoke with Dr. Basil Asif who accepted the transfer. Patient comfortable and agreeable to plan of care. Amount and/or Complexity of Data Reviewed  Labs: ordered. Radiology: ordered. ECG/medicine tests: ordered. Risk  Prescription drug management. EKG:  All EKG's are interpreted by the Emergency Department Physician who either signs or Co-signs this chart in the absence of a cardiologist.    ED Course as of 10/17/23 [address] Oct 17, 2023   1048 EKG shows sinus rhythm at a rate of 81, normal intervals, normal axis, no STEMI. [RD]   1326 Dr. Haider Lucero [phone] [RD]      ED Course

## 2023-10-17 NOTE — ED NOTES
TRANSFER - OUT REPORT:    Verbal [facility]  being [facility] to [facility] ED for routine progression of patient care       Report consisted of patient's Situation, Background, Assessment and   Recommendations(SBAR). Information from the following report(s) ED SBAR, MAR, Recent Results, and Cardiac Rhythm Sinus  was reviewed with the receiving nurse. Hunt[place] Fall Assessment:    Presents to emergency department  because of falls (Syncope, seizure, or loss of consciousness): No  Age > 70: No  Altered Mental Status, Intoxication with alcohol or substance confusion (Disorientation, impaired judgment, poor safety awaremess, or inability to follow instructions): No  Impaired Mobility: Ambulates or transfers with assistive devices or assistance; Unable to ambulate or transer.: No  Nursing Judgement: No          Lines:   Peripheral IV 10/17/23 Left Antecubital (Active)   Site Assessment Clean, dry & intact 10/17/23 1018   Line Status Flushed;Blood return noted 10/17/23 1018        Opportunity for questions and clarification was provided.       Patient transported with:  Monitor, IV Potassium           Rigoberto Fu RN  10/17/23 2313

## 2023-10-17 NOTE — ED NOTES
Pt reports he can not void at this time but will provide urine specimen when he can. Urinal placed at bedside.       Nany Rosa, [address]  10/17/23 8293

## 2023-10-17 NOTE — ED NOTES
When Solu-medrol was scanned high alert was given stating 125 mg was greater than 60 mg one time dose. Confirmed with [facility] [place] pharmacist that 125 mg solu-medrol was appropriate dose.       Lety Le RN  10/17/23 3413

## 2023-10-17 NOTE — ED TRIAGE NOTES
Pt reports history of UC. Pt reports consistent bloody diarrhea for te past 2 weeks and unable to tolerate anything PO for the past 2 days. Pt reports vomiting twice yesterday. Pt reports intermittent abdominal pain but denies pain at this time. Pt reports his GI MD is Shant Araiza.

## 2025-02-12 ENCOUNTER — TRANSCRIBE ORDERS (OUTPATIENT)
Facility: HOSPITAL | Age: 70
End: 2025-02-12

## 2025-02-12 DIAGNOSIS — R93.89 ABNORMAL CHEST CT: Primary | ICD-10-CM

## 2025-02-17 ENCOUNTER — HOSPITAL ENCOUNTER (OUTPATIENT)
Facility: HOSPITAL | Age: 70
Discharge: HOME | End: 2025-02-20
Attending: INTERNAL MEDICINE
Payer: MEDICARE

## 2025-02-17 DIAGNOSIS — R93.89 ABNORMAL CHEST CT: ICD-10-CM

## 2025-02-17 PROCEDURE — 71250 CT THORAX DX C-: CPT

## (undated) DEVICE — FORCEPS BX L240CM JAW DIA2.2MM RAD JAW 4 HOT DISP